# Patient Record
Sex: FEMALE | Race: WHITE | URBAN - METROPOLITAN AREA
[De-identification: names, ages, dates, MRNs, and addresses within clinical notes are randomized per-mention and may not be internally consistent; named-entity substitution may affect disease eponyms.]

---

## 2018-02-25 ENCOUNTER — HOSPITAL ENCOUNTER (EMERGENCY)
Facility: CLINIC | Age: 23
Discharge: HOME | End: 2018-02-25
Attending: EMERGENCY MEDICINE | Admitting: EMERGENCY MEDICINE
Payer: COMMERCIAL

## 2018-02-25 VITALS
SYSTOLIC BLOOD PRESSURE: 109 MMHG | DIASTOLIC BLOOD PRESSURE: 82 MMHG | TEMPERATURE: 98.3 F | RESPIRATION RATE: 23 BRPM | OXYGEN SATURATION: 100 % | HEART RATE: 92 BPM

## 2018-02-25 DIAGNOSIS — R55 VASOVAGAL SYNCOPE: ICD-10-CM

## 2018-02-25 DIAGNOSIS — N39.0 URINARY TRACT INFECTION WITHOUT HEMATURIA, SITE UNSPECIFIED: ICD-10-CM

## 2018-02-25 LAB
ALBUMIN SERPL-MCNC: 3.7 G/DL (ref 3.4–5)
ALBUMIN UR-MCNC: NEGATIVE MG/DL
ALP SERPL-CCNC: 51 U/L (ref 40–150)
ALT SERPL W P-5'-P-CCNC: 19 U/L (ref 0–50)
ANION GAP SERPL CALCULATED.3IONS-SCNC: 7 MMOL/L (ref 3–14)
APPEARANCE UR: ABNORMAL
AST SERPL W P-5'-P-CCNC: 14 U/L (ref 0–45)
BACTERIA #/AREA URNS HPF: ABNORMAL /HPF
BASOPHILS # BLD AUTO: 0 10E9/L (ref 0–0.2)
BASOPHILS NFR BLD AUTO: 0.1 %
BILIRUB DIRECT SERPL-MCNC: 0.1 MG/DL (ref 0–0.2)
BILIRUB SERPL-MCNC: 0.7 MG/DL (ref 0.2–1.3)
BILIRUB UR QL STRIP: NEGATIVE
BUN SERPL-MCNC: 10 MG/DL (ref 7–30)
CALCIUM SERPL-MCNC: 7.7 MG/DL (ref 8.5–10.1)
CHLORIDE SERPL-SCNC: 110 MMOL/L (ref 94–109)
CO2 SERPL-SCNC: 23 MMOL/L (ref 20–32)
COLOR UR AUTO: YELLOW
CREAT SERPL-MCNC: 0.66 MG/DL (ref 0.52–1.04)
D DIMER PPP FEU-MCNC: <0.3 UG/ML FEU (ref 0–0.5)
DIFFERENTIAL METHOD BLD: ABNORMAL
EOSINOPHIL # BLD AUTO: 0 10E9/L (ref 0–0.7)
EOSINOPHIL NFR BLD AUTO: 0.4 %
ERYTHROCYTE [DISTWIDTH] IN BLOOD BY AUTOMATED COUNT: 12.5 % (ref 10–15)
FLUAV+FLUBV AG SPEC QL: NEGATIVE
FLUAV+FLUBV AG SPEC QL: NEGATIVE
GFR SERPL CREATININE-BSD FRML MDRD: >90 ML/MIN/1.7M2
GLUCOSE SERPL-MCNC: 114 MG/DL (ref 70–99)
GLUCOSE UR STRIP-MCNC: NEGATIVE MG/DL
HCG SERPL QL: NEGATIVE
HCT VFR BLD AUTO: 36.5 % (ref 35–47)
HGB BLD-MCNC: 11.9 G/DL (ref 11.7–15.7)
HGB UR QL STRIP: ABNORMAL
HYALINE CASTS #/AREA URNS LPF: 1 /LPF (ref 0–2)
IMM GRANULOCYTES # BLD: 0 10E9/L (ref 0–0.4)
IMM GRANULOCYTES NFR BLD: 0.1 %
KETONES UR STRIP-MCNC: NEGATIVE MG/DL
LEUKOCYTE ESTERASE UR QL STRIP: NEGATIVE
LYMPHOCYTES # BLD AUTO: 0.8 10E9/L (ref 0.8–5.3)
LYMPHOCYTES NFR BLD AUTO: 7.8 %
MAGNESIUM SERPL-MCNC: 1.7 MG/DL (ref 1.6–2.3)
MCH RBC QN AUTO: 28.5 PG (ref 26.5–33)
MCHC RBC AUTO-ENTMCNC: 32.6 G/DL (ref 31.5–36.5)
MCV RBC AUTO: 87 FL (ref 78–100)
MONOCYTES # BLD AUTO: 0.7 10E9/L (ref 0–1.3)
MONOCYTES NFR BLD AUTO: 6.4 %
MUCOUS THREADS #/AREA URNS LPF: PRESENT /LPF
NEUTROPHILS # BLD AUTO: 9.1 10E9/L (ref 1.6–8.3)
NEUTROPHILS NFR BLD AUTO: 85.2 %
NITRATE UR QL: NEGATIVE
PH UR STRIP: 5 PH (ref 5–7)
PLATELET # BLD AUTO: 331 10E9/L (ref 150–450)
POTASSIUM SERPL-SCNC: 3.6 MMOL/L (ref 3.4–5.3)
PROT SERPL-MCNC: 7 G/DL (ref 6.8–8.8)
RBC # BLD AUTO: 4.18 10E12/L (ref 3.8–5.2)
RBC #/AREA URNS AUTO: 2 /HPF (ref 0–2)
SODIUM SERPL-SCNC: 140 MMOL/L (ref 133–144)
SOURCE: ABNORMAL
SP GR UR STRIP: 1.01 (ref 1–1.03)
SPECIMEN SOURCE: NORMAL
SQUAMOUS #/AREA URNS AUTO: 2 /HPF (ref 0–1)
UROBILINOGEN UR STRIP-MCNC: 0 MG/DL (ref 0–2)
WBC # BLD AUTO: 10.7 10E9/L (ref 4–11)
WBC #/AREA URNS AUTO: 3 /HPF (ref 0–2)

## 2018-02-25 PROCEDURE — 80076 HEPATIC FUNCTION PANEL: CPT | Performed by: EMERGENCY MEDICINE

## 2018-02-25 PROCEDURE — 25000132 ZZH RX MED GY IP 250 OP 250 PS 637: Performed by: EMERGENCY MEDICINE

## 2018-02-25 PROCEDURE — 93010 ELECTROCARDIOGRAM REPORT: CPT | Mod: Z6 | Performed by: EMERGENCY MEDICINE

## 2018-02-25 PROCEDURE — 99284 EMERGENCY DEPT VISIT MOD MDM: CPT | Mod: 25 | Performed by: EMERGENCY MEDICINE

## 2018-02-25 PROCEDURE — 87086 URINE CULTURE/COLONY COUNT: CPT | Performed by: EMERGENCY MEDICINE

## 2018-02-25 PROCEDURE — 85379 FIBRIN DEGRADATION QUANT: CPT | Performed by: EMERGENCY MEDICINE

## 2018-02-25 PROCEDURE — 83735 ASSAY OF MAGNESIUM: CPT | Performed by: EMERGENCY MEDICINE

## 2018-02-25 PROCEDURE — 99284 EMERGENCY DEPT VISIT MOD MDM: CPT | Mod: 25

## 2018-02-25 PROCEDURE — 96360 HYDRATION IV INFUSION INIT: CPT

## 2018-02-25 PROCEDURE — 87804 INFLUENZA ASSAY W/OPTIC: CPT | Performed by: EMERGENCY MEDICINE

## 2018-02-25 PROCEDURE — 80048 BASIC METABOLIC PNL TOTAL CA: CPT | Performed by: EMERGENCY MEDICINE

## 2018-02-25 PROCEDURE — 93005 ELECTROCARDIOGRAM TRACING: CPT

## 2018-02-25 PROCEDURE — 99281 EMR DPT VST MAYX REQ PHY/QHP: CPT

## 2018-02-25 PROCEDURE — 85025 COMPLETE CBC W/AUTO DIFF WBC: CPT | Performed by: EMERGENCY MEDICINE

## 2018-02-25 PROCEDURE — 81001 URINALYSIS AUTO W/SCOPE: CPT | Performed by: EMERGENCY MEDICINE

## 2018-02-25 PROCEDURE — 25000128 H RX IP 250 OP 636: Performed by: EMERGENCY MEDICINE

## 2018-02-25 PROCEDURE — 96361 HYDRATE IV INFUSION ADD-ON: CPT

## 2018-02-25 PROCEDURE — 84703 CHORIONIC GONADOTROPIN ASSAY: CPT | Performed by: EMERGENCY MEDICINE

## 2018-02-25 RX ORDER — ACETAMINOPHEN 500 MG
1000 TABLET ORAL ONCE
Status: COMPLETED | OUTPATIENT
Start: 2018-02-25 | End: 2018-02-25

## 2018-02-25 RX ORDER — SULFAMETHOXAZOLE/TRIMETHOPRIM 800-160 MG
1 TABLET ORAL 2 TIMES DAILY
Qty: 6 TABLET | Refills: 0 | Status: SHIPPED | OUTPATIENT
Start: 2018-02-25 | End: 2018-02-28

## 2018-02-25 RX ADMIN — SODIUM CHLORIDE 1000 ML: 9 INJECTION, SOLUTION INTRAVENOUS at 16:21

## 2018-02-25 RX ADMIN — ACETAMINOPHEN 1000 MG: 500 TABLET ORAL at 18:11

## 2018-02-25 NOTE — ED PROVIDER NOTES
History     Chief Complaint   Patient presents with     Loss of Consciousness     syncopal event in bathroom, felt dizzy and woke up on floor, not diabetic, no injury/trauma, , EMS repots A&O on scene, no hx of syncope.     OZ Maya is a 22 year old female who had a syncopal episode this afternoon, shortly prior to arrival.  She states she was seated, felt nauseous, got up to go to the restroom and developed a lightheadedness and then had her vision closing in from the periphery and collapsed to the floor of the bathroom with brief LOC and prompt return to baseline mental status.  No injury or trauma.  No preceding or associated chest pain, palpitations, shortness of breath.  She did not bite her tongue, have bowel or bladder incontinence or have observed seizure-like activity.  No prior history of syncope.  No known history of cardiac disease or family history of significant cardiac disease or sudden cardiac death.  No signs or symptoms of PE or DVT. Significant air travel recently: 2/8, 2/12, 2/21, 2/23.  Has traveled from [place] to [place] to [place] to [facility]. Lives in [place]. Visiting here and returning tomorrow to [place] tomorrow.  She has a primary care provider in [facility].  Will be there 1 week before returning to [place].     Problem List:    There are no active problems to display for this patient.       Past Medical History:    History reviewed. No pertinent past medical history.    Past Surgical History:    No past surgical history on file.    Family History:    No family history on file.    Social History:  Marital Status:  Single [1]  Social History   Substance Use Topics     Smoking status: No     Smokeless tobacco: No     Alcohol use No        Medications:      No current outpatient prescriptions on file.  Allergies:   No Known Allergies    Review of Systems  As mentioned above in the history present illness.  All other systems were reviewed and are  negative.    Physical Exam   BP: 107/63  Pulse: 92  Heart Rate: 93  Temp: 99  F (37.2  C)  Resp: 16  SpO2: 100 %  Lying Orthostatic BP: 99/58 (1st orthostatic BP 16:24)  Lying Orthostatic Pulse: 96 bpm  Standing Orthostatic BP: 103/69 (2nd orthostatic BP 16:26)  Standing Orthostatic Pulse: 100 bpm      Physical Exam   Constitutional: She is oriented to person, place, and time. She appears well-developed and well-nourished. No distress.   HENT:   Head: Normocephalic and atraumatic.   Mouth/Throat: Oropharynx is clear and moist.   Oropharynx and tongue are atraumatic.   Eyes: Conjunctivae and EOM are normal. Pupils are equal, round, and reactive to light. No scleral icterus.   Neck: Normal range of motion. Neck supple. No tracheal deviation present. No thyromegaly present.   Cardiovascular: Normal rate, regular rhythm, normal heart sounds and intact distal pulses.  Exam reveals no gallop and no friction rub.    No murmur heard.  Pulmonary/Chest: Effort normal and breath sounds normal. No respiratory distress. She has no wheezes. She has no rales.   Abdominal: Soft. Bowel sounds are normal. She exhibits no distension. There is no tenderness.   Musculoskeletal: Normal range of motion. She exhibits no edema or tenderness.   No lower extremity palpable tenderness or cords, or edema.   Neurological: She is alert and oriented to person, place, and time. She has normal strength. No cranial nerve deficit ( 2-12 intact) or sensory deficit. She exhibits normal muscle tone. Coordination and gait normal.   Skin: Skin is warm and dry. No rash noted. She is not diaphoretic. No erythema. No pallor.   Psychiatric: She has a normal mood and affect. Her behavior is normal.   Nursing note and vitals reviewed.      ED Course     ED Course     Procedures               EKG Interpretation:      Interpreted by William Winston MD  Time reviewed: Upon completion  Symptoms at time of EKG: Asymptomatic  Rhythm: normal sinus   Rate:  normal  Axis: normal  Ectopy: none  Conduction: normal  ST Segments/ T Waves: No ST-T wave changes  Q Waves: none  Comparison to prior: No old EKG available  Clinical Impression: normal EKG    Results for orders placed or performed during the hospital encounter of 02/25/18   CBC with platelets, differential   Result Value Ref Range    WBC 10.7 4.0 - 11.0 10e9/L    RBC Count 4.18 3.8 - 5.2 10e12/L    Hemoglobin 11.9 11.7 - 15.7 g/dL    Hematocrit 36.5 35.0 - 47.0 %    MCV 87 78 - 100 fl    MCH 28.5 26.5 - 33.0 pg    MCHC 32.6 31.5 - 36.5 g/dL    RDW 12.5 10.0 - 15.0 %    Platelet Count 331 150 - 450 10e9/L    Diff Method Automated Method     % Neutrophils 85.2 %    % Lymphocytes 7.8 %    % Monocytes 6.4 %    % Eosinophils 0.4 %    % Basophils 0.1 %    % Immature Granulocytes 0.1 %    Absolute Neutrophil 9.1 (H) 1.6 - 8.3 10e9/L    Absolute Lymphocytes 0.8 0.8 - 5.3 10e9/L    Absolute Monocytes 0.7 0.0 - 1.3 10e9/L    Absolute Eosinophils 0.0 0.0 - 0.7 10e9/L    Absolute Basophils 0.0 0.0 - 0.2 10e9/L    Abs Immature Granulocytes 0.0 0 - 0.4 10e9/L   Basic metabolic panel   Result Value Ref Range    Sodium 140 133 - 144 mmol/L    Potassium 3.6 3.4 - 5.3 mmol/L    Chloride 110 (H) 94 - 109 mmol/L    Carbon Dioxide 23 20 - 32 mmol/L    Anion Gap 7 3 - 14 mmol/L    Glucose 114 (H) 70 - 99 mg/dL    Urea Nitrogen 10 7 - 30 mg/dL    Creatinine 0.66 0.52 - 1.04 mg/dL    GFR Estimate >90 >60 mL/min/1.7m2    GFR Estimate If Black >90 >60 mL/min/1.7m2    Calcium 7.7 (L) 8.5 - 10.1 mg/dL   UA with Microscopic reflex to Culture   Result Value Ref Range    Color Urine Yellow     Appearance Urine Slightly Cloudy     Glucose Urine Negative NEG^Negative mg/dL    Bilirubin Urine Negative NEG^Negative    Ketones Urine Negative NEG^Negative mg/dL    Specific Gravity Urine 1.010 1.003 - 1.035    Blood Urine Small (A) NEG^Negative    pH Urine 5.0 5.0 - 7.0 pH    Protein Albumin Urine Negative NEG^Negative mg/dL    Urobilinogen mg/dL 0.0  0.0 - 2.0 mg/dL    Nitrite Urine Negative NEG^Negative    Leukocyte Esterase Urine Negative NEG^Negative    Source Midstream Urine     WBC Urine 3 (H) 0 - 2 /HPF    RBC Urine 2 0 - 2 /HPF    Bacteria Urine Few (A) NEG^Negative /HPF    Squamous Epithelial /HPF Urine 2 (H) 0 - 1 /HPF    Mucous Urine Present (A) NEG^Negative /LPF    Hyaline Casts 1 0 - 2 /LPF   Magnesium   Result Value Ref Range    Magnesium 1.7 1.6 - 2.3 mg/dL   Hepatic panel   Result Value Ref Range    Bilirubin Direct 0.1 0.0 - 0.2 mg/dL    Bilirubin Total 0.7 0.2 - 1.3 mg/dL    Albumin 3.7 3.4 - 5.0 g/dL    Protein Total 7.0 6.8 - 8.8 g/dL    Alkaline Phosphatase 51 40 - 150 U/L    ALT 19 0 - 50 U/L    AST 14 0 - 45 U/L   HCG qualitative pregnancy (blood)   Result Value Ref Range    HCG Qualitative Serum Negative NEG^Negative   D dimer quantitative   Result Value Ref Range    D Dimer <0.3 0.0 - 0.50 ug/ml FEU   Influenza A/B antigen   Result Value Ref Range    Influenza A/B Agn Specimen Nasal     Influenza A Negative NEG^Negative    Influenza B Negative NEG^Negative   Urine Culture   Result Value Ref Range    Specimen Description Midstream Urine     Special Requests Specimen received in preservative     Culture Micro No growth           Medications   0.9% sodium chloride BOLUS (0 mLs Intravenous Stopped 2/25/18 1812)   acetaminophen (TYLENOL) tablet 1,000 mg (1,000 mg Oral Given 2/25/18 1811)     5:16 PM - Awaiting d-dimer. Has had 1 L. IVF by EMS and 1 L in the ED.    Pt spiked a fever. Tylenol. Denies recent infectious s/sx or recent fever.    Assessments & Plan (with Medical Decision Making)   1 month a syncopal episode with no injury or trauma.  Suspect this is a vasovagal syncopal episode. Doubt dysrhythmia, atypical ACS, seizure, DVT/PE. Recent air travel, but no other signs or symptoms to suggest PE or DVT and d-dimer study was negative.  Doubt PE/DVT.  Incidental finding of possible UTI, and low-grade fever develop shortly prior to  discharge.  Patient has no UTI signs or symptoms.  Urine culture sent and will initiate antibiotic therapy with Bactrim DS x 3 days as she is leaving the state tomorrow to return to [facility] where she has primary care provider.  No other infectious signs or symptoms. I recommended she follow-up with her primary care provider as soon as possible this coming week when she returns to [place]. Patient was provided instructions for supportive care and will return as needed for worsened condition or worsening symptoms, or new problems or concerns.    I have reviewed the nursing notes.    I have reviewed the findings, diagnosis, plan and need for follow up with the patient.    Discharge Medication List as of 2/25/2018  7:58 PM      START taking these medications    Details   sulfamethoxazole-trimethoprim (BACTRIM DS) 800-160 MG per tablet Take 1 tablet by mouth 2 times daily for 3 days, Disp-6 tablet, R-0, E-Prescribe             Final diagnoses:   Vasovagal syncope   Urinary tract infection without hematuria, site unspecified       2/25/2018   [facility] EMERGENCY DEPARTMENT     William Winston MD  03/01/18 8024

## 2018-02-25 NOTE — ED AVS SNAPSHOT
[facility] Emergency Department    [address] [address]    Phone:  [phone]    Fax:  [phone]                                       Gilma Maya   MRN: [id number]    Department:  [facility] Emergency Department   Date of Visit:  2/25/2018           Patient Information     Date Of Birth          1995        Your diagnoses for this visit were:     Vasovagal syncope     Urinary tract infection without hematuria, site unspecified        You were seen by William Winston MD.      Follow-up Information     Follow up with System, Provider Not In.    Specialty:  Clinic    Why:  follow-up in Primary Care Clinic when you return home.    Contact information:               Follow up with [facility] Emergency Department.    Specialty:  EMERGENCY MEDICINE    Why:  If symptoms worsen    Contact information:    [address] [place] [id number] [phone]    Additional information:    The medical center is located at   [address] ([address] in [place], [place] north   of [place]).      Discharge References/Attachments     SYNCOPE, VASOVAGAL (ENGLISH)    VASOVAGAL SYNCOPE, UNDERSTANDING (ENGLISH)    SYNCOPE, WHAT IS (ENGLISH)    SYNCOPE, TREATING: PREVENTION (ENGLISH)    SYNCOPE, CAUSES OF (ENGLISH)    BLADDER INFECTION, FEMALE (ADULT) (ENGLISH)      24 Hour Appointment Hotline       To make an appointment at any [facility] clinic, call [phone] ([phone]). If you don't have a family doctor or clinic, we will help you find one. [facility] clinics are conveniently located to serve the needs of you and your family.             Review of your medicines      START taking        Dose / Directions Last dose taken    sulfamethoxazole-trimethoprim 800-160 MG per tablet   Commonly known as:  BACTRIM DS   Dose:  1 tablet   Quantity:  6 tablet        Take 1 tablet by mouth 2 times daily for 3 days   Refills:  0                 Prescriptions were sent or printed at these locations (1 Prescription)                   [facility] [place] - [address]   [address], [place] [address]    Telephone:  [phone]   Fax:  [phone]   Hours:                  E-Prescribed (1 of 1)         sulfamethoxazole-trimethoprim (BACTRIM DS) 800-160 MG per tablet                Procedures and tests performed during your visit     Basic metabolic panel    CBC with platelets, differential    D dimer quantitative    EKG 12 lead    HCG qualitative pregnancy (blood)    Hepatic panel    Influenza A/B antigen    Magnesium    Orthostatic blood pressure and pulse    UA with Microscopic reflex to Culture    Urine Culture      Orders Needing Specimen Collection     None      Pending Results     Date and Time Order Name Status Description    2/25/2018 1921 Urine Culture In process             Pending Culture Results     Date and Time Order Name Status Description    2/25/2018 1921 Urine Culture In process             Pending Results Instructions     If you had any lab results that were not finalized at the time of your Discharge, you can call the ED Lab Result RN at [phone]. You will be contacted by this team for any positive Lab results or changes in treatment. The nurses are available 7 days a week from 10A to 6:30P.  You can leave a message 24 hours per day and they will return your call.        Test Results From Your Hospital Stay        2/25/2018  3:55 PM      Component Results     Component Value Ref Range & Units Status    WBC 10.7 4.0 - 11.0 10e9/L Final    RBC Count 4.18 3.8 - 5.2 10e12/L Final    Hemoglobin 11.9 11.7 - 15.7 g/dL Final    Hematocrit 36.5 35.0 - 47.0 % Final    MCV 87 78 - 100 fl Final    MCH 28.5 26.5 - 33.0 pg Final    MCHC 32.6 31.5 - 36.5 g/dL Final    RDW 12.5 10.0 - 15.0 % Final    Platelet Count 331 150 - 450 10e9/L Final    Diff Method Automated Method  Final    % Neutrophils 85.2 % Final    %  Lymphocytes 7.8 % Final    % Monocytes 6.4 % Final    % Eosinophils 0.4 % Final    % Basophils 0.1 % Final    % Immature Granulocytes 0.1 % Final    Absolute Neutrophil 9.1 (H) 1.6 - 8.3 10e9/L Final    Absolute Lymphocytes 0.8 0.8 - 5.3 10e9/L Final    Absolute Monocytes 0.7 0.0 - 1.3 10e9/L Final    Absolute Eosinophils 0.0 0.0 - 0.7 10e9/L Final    Absolute Basophils 0.0 0.0 - 0.2 10e9/L Final    Abs Immature Granulocytes 0.0 0 - 0.4 10e9/L Final         2/25/2018  4:05 PM      Component Results     Component Value Ref Range & Units Status    Sodium 140 133 - 144 mmol/L Final    Potassium 3.6 3.4 - 5.3 mmol/L Final    Chloride 110 (H) 94 - 109 mmol/L Final    Carbon Dioxide 23 20 - 32 mmol/L Final    Anion Gap 7 3 - 14 mmol/L Final    Glucose 114 (H) 70 - 99 mg/dL Final    Urea Nitrogen 10 7 - 30 mg/dL Final    Creatinine 0.66 0.52 - 1.04 mg/dL Final    GFR Estimate >90 >60 mL/min/1.7m2 Final    Non  GFR Calc    GFR Estimate If Black >90 >60 mL/min/1.7m2 Final    African American GFR Calc    Calcium 7.7 (L) 8.5 - 10.1 mg/dL Final         2/25/2018  5:43 PM      Component Results     Component Value Ref Range & Units Status    Color Urine Yellow  Final    Appearance Urine Slightly Cloudy  Final    Glucose Urine Negative NEG^Negative mg/dL Final    Bilirubin Urine Negative NEG^Negative Final    Ketones Urine Negative NEG^Negative mg/dL Final    Specific Gravity Urine 1.010 1.003 - 1.035 Final    Blood Urine Small (A) NEG^Negative Final    pH Urine 5.0 5.0 - 7.0 pH Final    Protein Albumin Urine Negative NEG^Negative mg/dL Final    Urobilinogen mg/dL 0.0 0.0 - 2.0 mg/dL Final    Nitrite Urine Negative NEG^Negative Final    Leukocyte Esterase Urine Negative NEG^Negative Final    Source Midstream Urine  Final    WBC Urine 3 (H) 0 - 2 /HPF Final    RBC Urine 2 0 - 2 /HPF Final    Bacteria Urine Few (A) NEG^Negative /HPF Final    Squamous Epithelial /HPF Urine 2 (H) 0 - 1 /HPF Final    Mucous Urine  Present (A) NEG^Negative /LPF Final    Hyaline Casts 1 0 - 2 /LPF Final         2/25/2018  4:05 PM      Component Results     Component Value Ref Range & Units Status    Magnesium 1.7 1.6 - 2.3 mg/dL Final         2/25/2018  5:59 PM      Component Results     Component Value Ref Range & Units Status    Bilirubin Direct 0.1 0.0 - 0.2 mg/dL Final    Bilirubin Total 0.7 0.2 - 1.3 mg/dL Final    Albumin 3.7 3.4 - 5.0 g/dL Final    Protein Total 7.0 6.8 - 8.8 g/dL Final    Alkaline Phosphatase 51 40 - 150 U/L Final    ALT 19 0 - 50 U/L Final    AST 14 0 - 45 U/L Final         2/25/2018  5:54 PM      Component Results     Component Value Ref Range & Units Status    HCG Qualitative Serum Negative NEG^Negative Final    This test is for screening purposes.  Results should be interpreted along with   the clinical picture.  Confirmation testing is available if warranted by   ordering CAU609, HCG Quantitative Pregnancy.           2/25/2018  5:53 PM      Component Results     Component Value Ref Range & Units Status    D Dimer <0.3 0.0 - 0.50 ug/ml FEU Final    This D-dimer assay is intended for use in conjunction with a clinical pretest   probability assessment model to exclude pulmonary embolism (PE) and deep   venous thrombosis (DVT) in outpatients suspected of PE or DVT. The cut-off   value is 0.5 ug/mL FEU.           2/25/2018  6:54 PM      Component Results     Component Value Ref Range & Units Status    Influenza A/B Agn Specimen Nasal  Final    Influenza A Negative NEG^Negative Final    Influenza B Negative NEG^Negative Final    Test results must be correlated with clinical data. If necessary, results   should be confirmed by a molecular assay or viral culture.           2/25/2018  7:27 PM                Thank you for choosing [facility]       Thank you for choosing [facility] for your care. Our goal is always to provide you with excellent care. Hearing back from our patients is one way we can continue to improve our  "services. Please take a few minutes to complete the written survey that you may receive in the mail after you visit with us. Thank you!        [employer] Information     [employer] lets you send messages to your doctor, view your test results, renew your prescriptions, schedule appointments and more. To sign up, go to [email or web address] . Click on \"Log in\" on the left side of the screen, which will take you to the Welcome page. Then click on \"Sign up Now\" on the right side of the page.     You will be asked to enter the access code listed below, as well as some personal information. Please follow the directions to create your username and password.     Your access code is: [email or web address]  Expires: 2018  7:58 PM     Your access code will  in 90 days. If you need help or a new code, please call your [place] clinic or [phone].        Care EveryWhere ID     This is your Care EveryWhere ID. This could be used by other organizations to access your [place] medical records  [id number]        Equal Access to Services     [facility]: Hadii marleny Mcclelland, waaxda lumaster, qaybta kaalcaty davis, fady mccall . So [facility] [phone].    ATENCIÓN: Si habla español, tiene a rausch disposición servicios gratuitos de asistencia lingüística. Agata al [phone].    We comply with applicable federal civil rights laws and [place] laws. We do not discriminate on the basis of race, color, national origin, age, disability, sex, sexual orientation, or gender identity.            After Visit Summary       This is your record. Keep this with you and show to your community pharmacist(s) and doctor(s) at your next visit.                  "

## 2018-02-25 NOTE — ED AVS SNAPSHOT
[facility] Emergency Department    [address] [address]    Phone:  [phone]    Fax:  [phone]                                       Gilma Maya   MRN: [id number]    Department:  [facility] Emergency Department   Date of Visit:  2/25/2018           After Visit Summary Signature Page     I have received my discharge instructions, and my questions have been answered. I have discussed any challenges I see with this plan with the nurse or doctor.    ..........................................................................................................................................  Patient/Patient Representative Signature      ..........................................................................................................................................  Patient Representative Print Name and Relationship to Patient    ..................................................               ................................................  Date                                            Time    ..........................................................................................................................................  Reviewed by Signature/Title    ...................................................              ..............................................  Date                                                            Time

## 2018-02-26 LAB
BACTERIA SPEC CULT: NO GROWTH
Lab: NORMAL
SPECIMEN SOURCE: NORMAL

## 2018-02-26 NOTE — ED NOTES
Pt has been on cardiac monitor for about 2.5 hours, no ectopy seen, NSR in the 90's and low 100's. Pt up to the bathroom to urinate